# Patient Record
Sex: FEMALE | Race: WHITE | URBAN - METROPOLITAN AREA
[De-identification: names, ages, dates, MRNs, and addresses within clinical notes are randomized per-mention and may not be internally consistent; named-entity substitution may affect disease eponyms.]

---

## 2021-01-07 ENCOUNTER — APPOINTMENT (RX ONLY)
Dept: URBAN - METROPOLITAN AREA CLINIC 123 | Facility: CLINIC | Age: 56
Setting detail: DERMATOLOGY
End: 2021-01-07

## 2021-01-07 DIAGNOSIS — Z85.828 PERSONAL HISTORY OF OTHER MALIGNANT NEOPLASM OF SKIN: ICD-10-CM

## 2021-01-07 PROBLEM — D48.5 NEOPLASM OF UNCERTAIN BEHAVIOR OF SKIN: Status: ACTIVE | Noted: 2021-01-07

## 2021-01-07 PROCEDURE — ? COUNSELING

## 2021-01-07 PROCEDURE — ? ADDITIONAL NOTES

## 2021-01-07 PROCEDURE — ? BIOPSY BY SHAVE METHOD

## 2021-01-07 PROCEDURE — 11102 TANGNTL BX SKIN SINGLE LES: CPT

## 2021-01-07 PROCEDURE — ? FULL BODY SKIN EXAM - DECLINED

## 2021-01-07 NOTE — PROCEDURE: BIOPSY BY SHAVE METHOD
Triangulation (Location Of Lesion In Relation To Distance From Anatomical Landmarks): 11:00 @ 6:15 right tibial tuberosity

## 2021-01-19 ENCOUNTER — APPOINTMENT (RX ONLY)
Dept: URBAN - METROPOLITAN AREA CLINIC 123 | Facility: CLINIC | Age: 56
Setting detail: DERMATOLOGY
End: 2021-01-19

## 2021-01-19 PROBLEM — C44.722 SQUAMOUS CELL CARCINOMA OF SKIN OF RIGHT LOWER LIMB, INCLUDING HIP: Status: ACTIVE | Noted: 2021-01-19

## 2021-01-19 PROCEDURE — ? MOHS SURGERY

## 2021-01-19 PROCEDURE — 17314 MOHS ADDL STAGE T/A/L: CPT

## 2021-01-19 PROCEDURE — 17313 MOHS 1 STAGE T/A/L: CPT

## 2021-01-19 PROCEDURE — ? ADDITIONAL NOTES

## 2021-01-19 NOTE — PROCEDURE: MOHS SURGERY
Patient Name: Anastacia Shore  Specialist or PCP: Dr Johnson  Symptoms: burring during urination, stomach pain, brown diarrhea, not eating well, no fever, canker sores in mouth.   Best Availability: within 1 hour  Callback Number: [phone]    Thank you,  Mikaela Rock   Consent (Scalp)/Introductory Paragraph: The rationale for Mohs was explained to the patient and consent was obtained. The risks, benefits and alternatives to therapy were discussed in detail. Specifically, the risks of changes in hair growth pattern secondary to repair, infection, scarring, bleeding, prolonged wound healing, incomplete removal, allergy to anesthesia, nerve injury and recurrence were addressed. Prior to the procedure, the treatment site was clearly identified and confirmed by the patient. All components of Universal Protocol/PAUSE Rule completed.

## 2021-09-23 ENCOUNTER — APPOINTMENT (RX ONLY)
Dept: URBAN - METROPOLITAN AREA CLINIC 123 | Facility: CLINIC | Age: 56
Setting detail: DERMATOLOGY
End: 2021-09-23

## 2021-09-23 DIAGNOSIS — Z85.828 PERSONAL HISTORY OF OTHER MALIGNANT NEOPLASM OF SKIN: ICD-10-CM

## 2021-09-23 DIAGNOSIS — L81.5 LEUKODERMA, NOT ELSEWHERE CLASSIFIED: ICD-10-CM

## 2021-09-23 DIAGNOSIS — L57.0 ACTINIC KERATOSIS: ICD-10-CM

## 2021-09-23 DIAGNOSIS — L82.1 OTHER SEBORRHEIC KERATOSIS: ICD-10-CM

## 2021-09-23 DIAGNOSIS — L30.4 ERYTHEMA INTERTRIGO: ICD-10-CM

## 2021-09-23 DIAGNOSIS — D18.0 HEMANGIOMA: ICD-10-CM

## 2021-09-23 DIAGNOSIS — D22 MELANOCYTIC NEVI: ICD-10-CM

## 2021-09-23 DIAGNOSIS — L73.8 OTHER SPECIFIED FOLLICULAR DISORDERS: ICD-10-CM

## 2021-09-23 DIAGNOSIS — L91.0 HYPERTROPHIC SCAR: ICD-10-CM

## 2021-09-23 DIAGNOSIS — F42.4 EXCORIATION (SKIN-PICKING) DISORDER: ICD-10-CM

## 2021-09-23 DIAGNOSIS — Z87.2 PERSONAL HISTORY OF DISEASES OF THE SKIN AND SUBCUTANEOUS TISSUE: ICD-10-CM

## 2021-09-23 DIAGNOSIS — L81.4 OTHER MELANIN HYPERPIGMENTATION: ICD-10-CM

## 2021-09-23 PROBLEM — D22.5 MELANOCYTIC NEVI OF TRUNK: Status: ACTIVE | Noted: 2021-09-23

## 2021-09-23 PROBLEM — D37.01 NEOPLASM OF UNCERTAIN BEHAVIOR OF LIP: Status: ACTIVE | Noted: 2021-09-23

## 2021-09-23 PROBLEM — S00.401A UNSPECIFIED SUPERFICIAL INJURY OF RIGHT EAR, INITIAL ENCOUNTER: Status: ACTIVE | Noted: 2021-09-23

## 2021-09-23 PROBLEM — D18.01 HEMANGIOMA OF SKIN AND SUBCUTANEOUS TISSUE: Status: ACTIVE | Noted: 2021-09-23

## 2021-09-23 PROCEDURE — ? OBSERVATION AND MEASURE

## 2021-09-23 PROCEDURE — 99213 OFFICE O/P EST LOW 20 MIN: CPT | Mod: 25

## 2021-09-23 PROCEDURE — ? FULL BODY SKIN EXAM - DECLINED

## 2021-09-23 PROCEDURE — ? TREATMENT REGIMEN

## 2021-09-23 PROCEDURE — ? LIQUID NITROGEN

## 2021-09-23 PROCEDURE — 17000 DESTRUCT PREMALG LESION: CPT

## 2021-09-23 PROCEDURE — ? COUNSELING

## 2021-09-23 PROCEDURE — ? PRESCRIPTION

## 2021-09-23 PROCEDURE — 40490 BIOPSY OF LIP: CPT | Mod: 59

## 2021-09-23 PROCEDURE — ? ADDITIONAL NOTES

## 2021-09-23 PROCEDURE — 17003 DESTRUCT PREMALG LES 2-14: CPT

## 2021-09-23 PROCEDURE — ? BIOPSY BY SHAVE METHOD

## 2021-09-23 PROCEDURE — ? SUNSCREEN RECOMMENDATIONS

## 2021-09-23 PROCEDURE — ? DEFER

## 2021-09-23 RX ORDER — KETOCONAZOLE 20 MG/G
CREAM TOPICAL
Qty: 60 | Refills: 11 | Status: ERX | COMMUNITY
Start: 2021-09-23

## 2021-09-23 RX ORDER — FLUOROURACIL 5 MG/G
CREAM TOPICAL
Qty: 40 | Refills: 1 | COMMUNITY
Start: 2021-09-23

## 2021-09-23 RX ADMIN — KETOCONAZOLE: 20 CREAM TOPICAL at 00:00

## 2021-09-23 RX ADMIN — FLUOROURACIL: 5 CREAM TOPICAL at 00:00

## 2021-09-23 ASSESSMENT — LOCATION ZONE DERM
LOCATION ZONE: ARM
LOCATION ZONE: EAR
LOCATION ZONE: FACE
LOCATION ZONE: LEG
LOCATION ZONE: TRUNK

## 2021-09-23 ASSESSMENT — LOCATION DETAILED DESCRIPTION DERM
LOCATION DETAILED: RIGHT SUPERIOR LATERAL MIDBACK
LOCATION DETAILED: RIGHT PROXIMAL DORSAL FOREARM
LOCATION DETAILED: RIGHT SUPERIOR HELIX
LOCATION DETAILED: INFERIOR MID FOREHEAD
LOCATION DETAILED: MIDDLE STERNUM
LOCATION DETAILED: RIGHT PROXIMAL RADIAL DORSAL FOREARM
LOCATION DETAILED: LEFT PROXIMAL DORSAL FOREARM
LOCATION DETAILED: RIGHT DISTAL PRETIBIAL REGION
LOCATION DETAILED: RIGHT PROXIMAL PRETIBIAL REGION
LOCATION DETAILED: LOWER STERNUM
LOCATION DETAILED: RIGHT PROXIMAL POSTERIOR UPPER ARM
LOCATION DETAILED: EPIGASTRIC SKIN
LOCATION DETAILED: RIGHT SUPERIOR UPPER BACK
LOCATION DETAILED: LEFT INFERIOR UPPER BACK
LOCATION DETAILED: LEFT SUPERIOR MEDIAL UPPER BACK
LOCATION DETAILED: LEFT SUPERIOR UPPER BACK
LOCATION DETAILED: LEFT LATERAL SUPERIOR CHEST
LOCATION DETAILED: LEFT MEDIAL SUPERIOR CHEST

## 2021-09-23 ASSESSMENT — LOCATION SIMPLE DESCRIPTION DERM
LOCATION SIMPLE: RIGHT POSTERIOR UPPER ARM
LOCATION SIMPLE: RIGHT UPPER BACK
LOCATION SIMPLE: RIGHT EAR
LOCATION SIMPLE: INFERIOR FOREHEAD
LOCATION SIMPLE: LEFT UPPER BACK
LOCATION SIMPLE: RIGHT PRETIBIAL REGION
LOCATION SIMPLE: RIGHT FOREARM
LOCATION SIMPLE: RIGHT LOWER BACK
LOCATION SIMPLE: CHEST
LOCATION SIMPLE: LEFT FOREARM
LOCATION SIMPLE: ABDOMEN

## 2021-09-23 NOTE — PROCEDURE: ADDITIONAL NOTES
Detail Level: Simple
Additional Notes: Patient consent was obtained to proceed with the visit and recommended plan of care after discussion of all risks and benefits, including the risks of COVID-19 exposure.
Additional Notes: Pt to have her onc/breast surgeon clear use before starting 5-FU
Render Risk Assessment In Note?: no

## 2021-11-17 ENCOUNTER — APPOINTMENT (RX ONLY)
Dept: URBAN - METROPOLITAN AREA CLINIC 123 | Facility: CLINIC | Age: 56
Setting detail: DERMATOLOGY
End: 2021-11-17

## 2021-11-17 DIAGNOSIS — L91.0 HYPERTROPHIC SCAR: ICD-10-CM

## 2021-11-17 DIAGNOSIS — L56.8 OTHER SPECIFIED ACUTE SKIN CHANGES DUE TO ULTRAVIOLET RADIATION: ICD-10-CM

## 2021-11-17 DIAGNOSIS — L57.0 ACTINIC KERATOSIS: ICD-10-CM

## 2021-11-17 DIAGNOSIS — D22 MELANOCYTIC NEVI: ICD-10-CM

## 2021-11-17 PROBLEM — D22.5 MELANOCYTIC NEVI OF TRUNK: Status: ACTIVE | Noted: 2021-11-17

## 2021-11-17 PROCEDURE — ? FULL BODY SKIN EXAM - DECLINED

## 2021-11-17 PROCEDURE — ? PATHOLOGY DISCUSSION

## 2021-11-17 PROCEDURE — ? DIAGNOSIS COMMENT

## 2021-11-17 PROCEDURE — ? PRESCRIPTION MEDICATION MANAGEMENT

## 2021-11-17 PROCEDURE — ? LIQUID NITROGEN

## 2021-11-17 PROCEDURE — ? ADDITIONAL NOTES

## 2021-11-17 PROCEDURE — ? COUNSELING

## 2021-11-17 PROCEDURE — 11900 INJECT SKIN LESIONS </W 7: CPT | Mod: 59

## 2021-11-17 PROCEDURE — ? OBSERVATION AND MEASURE

## 2021-11-17 PROCEDURE — 17000 DESTRUCT PREMALG LESION: CPT

## 2021-11-17 PROCEDURE — ? INTRALESIONAL KENALOG

## 2021-11-17 PROCEDURE — 99213 OFFICE O/P EST LOW 20 MIN: CPT | Mod: 25

## 2021-11-17 ASSESSMENT — LOCATION DETAILED DESCRIPTION DERM
LOCATION DETAILED: UPPER STERNUM
LOCATION DETAILED: RIGHT SUPERIOR LATERAL MIDBACK
LOCATION DETAILED: LEFT LATERAL SUPERIOR CHEST
LOCATION DETAILED: LEFT INFERIOR VERMILION BORDER

## 2021-11-17 ASSESSMENT — LOCATION SIMPLE DESCRIPTION DERM
LOCATION SIMPLE: LEFT LOWER LIP
LOCATION SIMPLE: RIGHT LOWER BACK
LOCATION SIMPLE: CHEST

## 2021-11-17 ASSESSMENT — LOCATION ZONE DERM
LOCATION ZONE: TRUNK
LOCATION ZONE: LIP

## 2021-11-17 NOTE — PROCEDURE: INTRALESIONAL KENALOG
Detail Level: Detailed
Kenalog Preparation: Kenalog
Include Z78.9 (Other Specified Conditions Influencing Health Status) As An Associated Diagnosis?: No
Consent: The risks of atrophy were reviewed with the patient.
Total Volume Injected (Ccs- Only Use Numbers And Decimals): 0.25
Concentration Of Solution Injected (Mg/Ml): 10.0
Administered By (Optional): Dr. Alexandre
Medical Necessity Clause: This procedure was medically necessary because the lesions that were treated were:
Validate Note Data When Using Inventory: Yes
X Size Of Lesion In Cm (Optional): 0

## 2021-11-17 NOTE — PROCEDURE: LIQUID NITROGEN
Show Aperture Variable?: Yes
Render Note In Bullet Format When Appropriate: No
Consent: The patient's consent was obtained including but not limited to risks of crusting, scabbing, blistering, scarring, darker or lighter pigmentary change, recurrence, incomplete removal and infection.
Detail Level: Detailed
Duration Of Freeze Thaw-Cycle (Seconds): 0
Post-Care Instructions: I reviewed with the patient in detail post-care instructions. Patient is to wear sunprotection, and avoid picking at any of the treated lesions. Pt may apply Vaseline to crusted or scabbing areas.

## 2025-07-04 NOTE — PROCEDURE: MOHS SURGERY
04-Jul-2025 13:20 Consent (Temporal Branch)/Introductory Paragraph: The rationale for Mohs was explained to the patient and consent was obtained. The risks, benefits and alternatives to therapy were discussed in detail. Specifically, the risks of damage to the temporal branch of the facial nerve, infection, scarring, bleeding, prolonged wound healing, incomplete removal, allergy to anesthesia, and recurrence were addressed. Prior to the procedure, the treatment site was clearly identified and confirmed by the patient. All components of Universal Protocol/PAUSE Rule completed.